# Patient Record
Sex: FEMALE | Race: BLACK OR AFRICAN AMERICAN | Employment: FULL TIME | URBAN - METROPOLITAN AREA
[De-identification: names, ages, dates, MRNs, and addresses within clinical notes are randomized per-mention and may not be internally consistent; named-entity substitution may affect disease eponyms.]

---

## 2021-12-15 ENCOUNTER — HOSPITAL ENCOUNTER (INPATIENT)
Age: 31
LOS: 6 days | Discharge: HOME | DRG: 885 | End: 2021-12-21
Attending: PSYCHIATRY & NEUROLOGY | Admitting: PSYCHIATRY & NEUROLOGY
Payer: OTHER GOVERNMENT

## 2021-12-15 PROBLEM — F32.A DEPRESSION: Status: ACTIVE | Noted: 2021-12-15

## 2021-12-15 PROBLEM — R45.851 SUICIDAL IDEATIONS: Status: ACTIVE | Noted: 2021-12-15

## 2021-12-15 PROBLEM — F10.10 ETOH ABUSE: Status: ACTIVE | Noted: 2021-12-15

## 2021-12-15 PROCEDURE — 65220000003 HC RM SEMIPRIVATE PSYCH

## 2021-12-15 RX ORDER — ACETAMINOPHEN 325 MG/1
650 TABLET ORAL
Status: DISCONTINUED | OUTPATIENT
Start: 2021-12-15 | End: 2021-12-21 | Stop reason: HOSPADM

## 2021-12-15 RX ORDER — TRAZODONE HYDROCHLORIDE 50 MG/1
50 TABLET ORAL
Status: DISCONTINUED | OUTPATIENT
Start: 2021-12-15 | End: 2021-12-21 | Stop reason: HOSPADM

## 2021-12-15 RX ORDER — ESCITALOPRAM OXALATE 20 MG/1
20 TABLET ORAL DAILY
Status: ON HOLD | COMMUNITY
End: 2021-12-21 | Stop reason: SDUPTHER

## 2021-12-15 RX ORDER — HYDROXYZINE 50 MG/1
50 TABLET, FILM COATED ORAL
Status: DISCONTINUED | OUTPATIENT
Start: 2021-12-15 | End: 2021-12-21 | Stop reason: HOSPADM

## 2021-12-15 RX ORDER — ESCITALOPRAM OXALATE 10 MG/1
20 TABLET ORAL DAILY
Status: DISCONTINUED | OUTPATIENT
Start: 2021-12-16 | End: 2021-12-21 | Stop reason: HOSPADM

## 2021-12-15 RX ORDER — IBUPROFEN 600 MG/1
600 TABLET ORAL
Status: DISCONTINUED | OUTPATIENT
Start: 2021-12-15 | End: 2021-12-21 | Stop reason: HOSPADM

## 2021-12-15 RX ORDER — TOPIRAMATE 100 MG/1
150 TABLET, FILM COATED ORAL 2 TIMES DAILY
COMMUNITY
End: 2021-12-21

## 2021-12-15 RX ORDER — LORAZEPAM 2 MG/ML
1 INJECTION INTRAMUSCULAR
Status: DISCONTINUED | OUTPATIENT
Start: 2021-12-15 | End: 2021-12-21 | Stop reason: HOSPADM

## 2021-12-15 RX ORDER — ADHESIVE BANDAGE
30 BANDAGE TOPICAL DAILY PRN
Status: DISCONTINUED | OUTPATIENT
Start: 2021-12-15 | End: 2021-12-21 | Stop reason: HOSPADM

## 2021-12-15 RX ORDER — ASPIRIN 325 MG/1
100 TABLET, FILM COATED ORAL DAILY
Status: DISCONTINUED | OUTPATIENT
Start: 2021-12-16 | End: 2021-12-21 | Stop reason: HOSPADM

## 2021-12-15 RX ORDER — IBUPROFEN 400 MG/1
400 TABLET ORAL
Status: DISCONTINUED | OUTPATIENT
Start: 2021-12-15 | End: 2021-12-15

## 2021-12-15 NOTE — BH NOTES
32 y.o  [place] female admitted voluntarily to [address] [place] [address] under the professional care of Dr. Magui Rodriguez with a diagnosis of Suicidal Ideations with plan to hang self and ETOH Abuse. Pt is a  and was admitted to [address] [place] [address] from [facility] ED. Pt was spent 24 + hours in the ED because they did not have any beds available. Pt boyfriend convinced her to go to ED for help as she relapsed on ETOH in Oct 2021 after being sober since March 2021. 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 Pt states she feels hopeless since she lost her job and is drinking again. Pt states she drinks daily and has been taking shots until she passes out. Pt has been to rehab x 2 via [facility] and inpatient at [facility]. Followed by psych at [facility]. Prescribed medications Lexapro 20 mg daily and Topamax 150 mg PO BID. Pt admits she has not been compliant since she relapsed. Denies drug use. Smokes daily but declined patch. Labs from [facility] were unremarkable, HCG neg,uds negative,BAL 53. Pt is alert, oriented x4. Affect is flat sad and depressed. Reports anxiety 7/10, no other s/s of ETOH withdrawal noted or reported. [facility] contacted, orders received. Vital signs stable, tour of unit provided.

## 2021-12-15 NOTE — PROGRESS NOTES
Problem: Pain Control  Goal: Maintain pain level at or below patient's acceptable level (or 5 if patient is unable to determine acceptable level)  Outcome: Ongoing  Patient's Stated Pain Goal: No pain Problem: Suicide  Goal: *STG: Seeks staff when feelings of self harm or harm towards others arise  Outcome: Progressing Towards Goal     Problem: Suicide  Goal: *STG: Attends activities and groups  Outcome: Progressing Towards Goal     Problem: Suicide  Goal: *STG:  Verbalizes alternative ways of dealing with maladaptive feelings/behaviors  Outcome: Progressing Towards Goal     Problem: Suicide  Goal: *STG/LTG: Complies with medication therapy  Outcome: Progressing Towards Goal

## 2021-12-16 PROCEDURE — 74011250637 HC RX REV CODE- 250/637: Performed by: PSYCHIATRY & NEUROLOGY

## 2021-12-16 PROCEDURE — 65220000003 HC RM SEMIPRIVATE PSYCH

## 2021-12-16 RX ORDER — OLANZAPINE 5 MG/1
5 TABLET ORAL
Status: CANCELLED | OUTPATIENT
Start: 2021-12-16

## 2021-12-16 RX ORDER — BENZTROPINE MESYLATE 1 MG/1
1 TABLET ORAL
Status: CANCELLED | OUTPATIENT
Start: 2021-12-16

## 2021-12-16 RX ORDER — MAG HYDROX/ALUMINUM HYD/SIMETH 200-200-20
30 SUSPENSION, ORAL (FINAL DOSE FORM) ORAL
Status: DISCONTINUED | OUTPATIENT
Start: 2021-12-16 | End: 2021-12-21 | Stop reason: HOSPADM

## 2021-12-16 RX ORDER — ACETAMINOPHEN 325 MG/1
650 TABLET ORAL
Status: CANCELLED | OUTPATIENT
Start: 2021-12-16

## 2021-12-16 RX ORDER — HALOPERIDOL 5 MG/ML
5 INJECTION INTRAMUSCULAR
Status: CANCELLED | OUTPATIENT
Start: 2021-12-16

## 2021-12-16 RX ORDER — LORAZEPAM 2 MG/ML
1 INJECTION INTRAMUSCULAR
Status: CANCELLED | OUTPATIENT
Start: 2021-12-16

## 2021-12-16 RX ORDER — LANOLIN ALCOHOL/MO/W.PET/CERES
6 CREAM (GRAM) TOPICAL
Status: CANCELLED | OUTPATIENT
Start: 2021-12-16

## 2021-12-16 RX ORDER — HYDROXYZINE 50 MG/1
50 TABLET, FILM COATED ORAL
Status: CANCELLED | OUTPATIENT
Start: 2021-12-16

## 2021-12-16 RX ORDER — DIPHENHYDRAMINE HYDROCHLORIDE 50 MG/ML
50 INJECTION, SOLUTION INTRAMUSCULAR; INTRAVENOUS
Status: CANCELLED | OUTPATIENT
Start: 2021-12-16

## 2021-12-16 RX ORDER — CHLORDIAZEPOXIDE HYDROCHLORIDE 25 MG/1
25 CAPSULE, GELATIN COATED ORAL
Status: CANCELLED | OUTPATIENT
Start: 2021-12-16

## 2021-12-16 RX ORDER — ADHESIVE BANDAGE
30 BANDAGE TOPICAL DAILY PRN
Status: CANCELLED | OUTPATIENT
Start: 2021-12-16

## 2021-12-16 RX ORDER — TRAZODONE HYDROCHLORIDE 50 MG/1
50 TABLET ORAL
Status: CANCELLED | OUTPATIENT
Start: 2021-12-16

## 2021-12-16 RX ADMIN — TRAZODONE HYDROCHLORIDE 50 MG: 50 TABLET ORAL at 21:57

## 2021-12-16 RX ADMIN — ALUMINUM HYDROXIDE, MAGNESIUM HYDROXIDE, AND SIMETHICONE 30 ML: 200; 200; 20 SUSPENSION ORAL at 21:30

## 2021-12-16 RX ADMIN — ESCITALOPRAM OXALATE 20 MG: 10 TABLET ORAL at 08:13

## 2021-12-16 RX ADMIN — HYDROXYZINE HYDROCHLORIDE 50 MG: 50 TABLET, FILM COATED ORAL at 08:13

## 2021-12-16 RX ADMIN — TOPIRAMATE 150 MG: 100 TABLET, FILM COATED ORAL at 21:57

## 2021-12-16 RX ADMIN — ACETAMINOPHEN 650 MG: 325 TABLET ORAL at 18:31

## 2021-12-16 RX ADMIN — THIAMINE HCL TAB 100 MG 100 MG: 100 TAB at 08:13

## 2021-12-16 RX ADMIN — TOPIRAMATE 150 MG: 100 TABLET, FILM COATED ORAL at 08:13

## 2021-12-16 NOTE — BH NOTES
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 The intake ID is [id number].

## 2021-12-16 NOTE — GROUP NOTE
IP  GROUP DOCUMENTATION INDIVIDUAL                                                                          Group Therapy Note    Date: 12/16/2021    Group Start Time: 0935  Group End Time: 9808  Group Topic: Education Group - Inpatient    SRM 2  NON ACUTE    Bethel GRIFFITHS  GROUP DOCUMENTATION GROUP    Group Therapy Note    Facilitated discussion focused on being aware of different feelings and their triggers and changes that need to be made to experience more comfortable feelings and less uncomfortable feelings    Attendees: 9/13         Attendance: Attended    Patient's Goal:  Attend group daily    Interventions/techniques: Informed and Supported    Follows Directions: Followed directions    Interactions: Interacted appropriately    Mental Status: Calm    Behavior/appearance: Cooperative    Goals Achieved: Able to engage in interactions, Able to listen to others, Able to self-disclose, Identified feelings and Identified triggers      Additional Notes:  Receptive to information and engaged.  Pt shared feeling \"depressed and anxious\" prior to admission due to \"not working, drinking, gain weight, have no money and have no purpose\" Pt shared in order to feel more  comfortable feelings she need to  \"find a way to stop drinking, find purpose, attend AA meetings, start a job, save money, diet, exercise, get back on medication and focus on positive relationships\"     Cristina Cooper

## 2021-12-16 NOTE — BH NOTES
Alert and oriented x4. Affect is flat and mood is depressed. Denies SI/HI at this time. No AVH noted or reported. CIWA 1 this am. No other s/s of detox noted. Pt isolative to her room, sleeping off and on. Medication and meal compliant. Remains on close observation for safety.

## 2021-12-16 NOTE — GROUP NOTE
IP  GROUP DOCUMENTATION INDIVIDUAL                                                                          Group Therapy Note    Date: 12/16/2021    Group Start Time: 3952  Group End Time: 1500  Group Topic: AA/NA    SRM 2 BEHA HLTH ACUTE    Kala Sandeep    IP  GROUP DOCUMENTATION GROUP    Group Therapy Note  The therapist facilitated a group on different relapse prevention strategies.      Attendees: 5         Attendance: {[facility] DOC ATTENDANCE:31770}    Patient's Goal:  ***    Interventions/techniques: {[facility] GROUP DOC INTERVENTIONS/TECHNIQUES:20844}    Follows Directions: {[facility] FOLLOWS DIRECTION:89422}    Interactions: {[facility] INTERACTIONS:78561}    Mental Status: {[facility] GROUP DOC MENTAL STATUS:62686}    Behavior/appearance: {[facility] GROUP DOC BEHAVIOR/APPEARANCE:63405}    Goals Achieved: {[facility] GROUP DOC GOALS ACHIEVED:42158}      Additional Notes:  ***    Rosalva Malave
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
## 2021-12-16 NOTE — PROGRESS NOTES
Problem: Suicide  Goal: *STG: Attends activities and groups  Outcome: Progressing Towards Goal     Problem: Suicide  Goal: *STG:  Verbalizes alternative ways of dealing with maladaptive feelings/behaviors  Outcome: Progressing Towards Goal     Problem: Suicide  Goal: *STG/LTG: Complies with medication therapy  Outcome: Progressing Towards Goal     Problem: Suicide  Goal: *STG/LTG: No longer expresses self destructive or suicidal thoughts  Outcome: Progressing Towards Goal

## 2021-12-16 NOTE — GROUP NOTE
[facility] GROUP DOCUMENTATION INDIVIDUAL                                                                          Group Therapy Note    Date: 12/16/2021    Group Start Time: 1000  Group End Time: 8725  Group Topic: [employer]    SRM 2 BEHA HLTH ACUTE    Desma Glass    IP [address] GROUP DOCUMENTATION GROUP    Group Therapy Note    Attendees: 6         Attendance: {[facility] GROUP DOC ATTENDANCE:11855}    Patient's Goal:  ***    Interventions/techniques: {[facility] GROUP DOC INTERVENTIONS/TECHNIQUES:09742}    Follows Directions: {[facility] GROUP DOC FOLLOWS DIRECTION:74127}    Interactions: {[facility] GROUP DOC INTERACTIONS:27559}    Mental Status: {[facility] GROUP DOC MENTAL STATUS:29257}    Behavior/appearance: {[facility] GROUP DOC BEHAVIOR/APPEARANCE:33379}    Goals Achieved: {[facility] GROUP DOC GOALS ACHIEVED:89726}      Additional Notes:  ***    Bridget Sneed

## 2021-12-16 NOTE — GROUP NOTE
[facility] GROUP DOCUMENTATION INDIVIDUAL                                                                          Group Therapy Note    Date: 12/16/2021    Group Start Time: 1239  Group End Time: 1500  Group Topic: AA/NA    SRM 2 [facility] ACUTE    Jane Loots    IP  GROUP DOCUMENTATION GROUP    Group Therapy Note  The therapist facilitated a group on different relapse prevention strategies. Attendees: 5         Attendance: Did not attend    Patient's Goal:      Interventions/techniques: Follows Directions:     Interactions:     Mental Status:     Behavior/appearance:     Goals Achieved: Additional Notes: The pt was invited to group but did not attend.      Misael Rios

## 2021-12-16 NOTE — BH NOTES
PSA PART II ADDITIONAL INFORMATION        Access To [employer]: No    Substance Use: YES    Last Use: 12/15/2021    Type of Substance: Alcohol use    Frequency of Use: Daily     Request to See : NO    If yes, notified : NO    Release of Information Signed: NO    Release of Information Signed For: The patient denied to sign NICOLE
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
## 2021-12-16 NOTE — GROUP NOTE
[facility] GROUP DOCUMENTATION INDIVIDUAL                                                                          Group Therapy Note    Date: 12/16/2021    Group Start Time: 1120  Group End Time: 3226  Group Topic: Process Group - Inpatient    [facility] CARE MANAGEMENT    Nicki Scott BSW    [facility] GROUP DOCUMENTATION GROUP    Group Therapy Note    The facilitator engaged the group in a worksheet and provided time for open discussion. And promoted feedback and support for one another. Attendees: 9/13          Attendance: Did not attend    Additional Notes: The patient was encouraged to attend group but did not attend.        SHAQUILLE Becker

## 2021-12-16 NOTE — PROGRESS NOTES
Shift Note:    Problem: Suicide  Goal: *STG: Remains safe in hospital  Outcome: Progressing Towards Goal  Goal: *STG/LTG: Complies with medication therapy  Outcome: Progressing Towards Goal  Goal: *STG/LTG: No longer expresses self destructive or suicidal thoughts  Outcome: Progressing Towards Goal     Ms. Moon was isolative and withdrawn to her room upon the onset of the shift. She denied thoughts to harm herself or others. She was able to contract for safety. She stated she did not need PRN medication for sleep or anxiety. She was alert and oriented times 4 and denied anxiety and did not rate depression. She presented labile when out of bed for water about 3:30 am.  She is currently in bed with eyes closed at even and unlabored breathing. Staff will continue to monitor on every 15 minute checks.

## 2021-12-17 PROCEDURE — 65220000003 HC RM SEMIPRIVATE PSYCH

## 2021-12-17 PROCEDURE — 74011250637 HC RX REV CODE- 250/637: Performed by: PSYCHIATRY & NEUROLOGY

## 2021-12-17 RX ADMIN — ESCITALOPRAM OXALATE 20 MG: 10 TABLET ORAL at 09:04

## 2021-12-17 RX ADMIN — TRAZODONE HYDROCHLORIDE 50 MG: 50 TABLET ORAL at 21:28

## 2021-12-17 RX ADMIN — TOPIRAMATE 150 MG: 100 TABLET, FILM COATED ORAL at 09:04

## 2021-12-17 RX ADMIN — THIAMINE HCL TAB 100 MG 100 MG: 100 TAB at 09:05

## 2021-12-17 RX ADMIN — HYDROXYZINE HYDROCHLORIDE 50 MG: 50 TABLET, FILM COATED ORAL at 09:08

## 2021-12-17 RX ADMIN — TOPIRAMATE 150 MG: 100 TABLET, FILM COATED ORAL at 21:24

## 2021-12-17 NOTE — GROUP NOTE
IP  GROUP DOCUMENTATION INDIVIDUAL                                                                          Group Therapy Note    Date: 12/17/2021    Group Start Time: 1320  Group End Time: 8805  Group Topic: Recreational/Music Therapy    SRM 2  NON ACUTE    Orlando Heaps    IP [facility] GROUP DOCUMENTATION GROUP    Group Therapy Note    Facilitated leisure skills group to reinforce positive coping through music, social interaction, group activities and arts/crafts    Attendees: 9/12         Attendance: Attended    Patient's Goal:  Attend group daily     Interventions/techniques: Art integration and Supported    Follows Directions:  Followed directions    Interactions: Interacted appropriately    Mental Status: Calm    Behavior/appearance: Cooperative    Goals Achieved: Able to engage in interactions and Able to listen to others      Additional Notes:  Receptive to listening to music and  a song she selected while writing in her journal. Interacted with peers and staff when prompted    Darcy Doe [address]

## 2021-12-17 NOTE — PROGRESS NOTES
Shift Note:    Problem: Suicide  Goal: *STG: Remains safe in hospital  Outcome: Progressing Towards Goal  Goal: *STG/LTG: Complies with medication therapy  Outcome: Progressing Towards Goal  Goal: *STG/LTG: No longer expresses self destructive or suicidal thoughts  Outcome: Progressing Towards Goal     Ms. Moon was alert and oriented times 4 at the onset of the shift she denied thoughts to harm herself or others. She denied hallucinations, anxiety and depression. She presented flat and able to express her needs appropriately. She requested night time medication for sleep. She received Trazodone along with her first dose of Topamax and Maalox PRN for gas. She had no further complaints of gas. She came to the nurses' station fully awake at [place] [address] requesting her door be shut completely with each safety check. She returned to bed with no signs of distress or anxiety. She will continued to be monitored on every 15 minute checks.

## 2021-12-17 NOTE — GROUP NOTE
[facility] GROUP DOCUMENTATION INDIVIDUAL                                                                          Group Therapy Note    Date: 12/17/2021    Group Start Time: 1115  Group End Time: 1200  Group Topic: Process Group - Inpatient    [facility] CARE MANAGEMENT    Nicki Scott BSW    [facility] GROUP DOCUMENTATION GROUP    Group Therapy Note    The facilitator engaged the group in a game and promoted feedback and support for one another. Attendees: 9/12         Attendance: Attended    Patient's Goal:  Attend Group     Interventions/techniques: Supported    Follows Directions: Followed directions    Interactions: Interacted appropriately    Mental Status: Anxious, Flat and Worried    Behavior/appearance: Agitated, Cooperative, Negative, Poor eye contact, Resistive/oppositional and Withdrawn/quiet    Goals Achieved: Able to listen to others      Additional Notes: The patient stated that she didn't want to come to group and that she was angry that she was there. But with time she was able to become less guarded and participated in the group game.      SHAQUILLE Soriano complains of pain/discomfort

## 2021-12-17 NOTE — BH NOTES
Behavioral Health Treatment Team Note     Patient goal(s) for today: 'go home'  Treatment team focus/goals: continue medication management, group therapy and discuss discharge planning     Progress note: Pt presents with a flat affect and congruent mood. Pt denied SI/HI/AVH and depression and anxieety. Pt is fixated on leaving, is still awaiting to speak with D19. Pt said she does not feel the need to be here because she just relapsed on alcohol and knows what she needs to do to return home. Pt still needs an inpatient level of care due to lack of insight into substance use and needing a safe discharge plan    LOS:  2  Expected LOS: 5-7 days     Insurance info/prescription coverage:  [employer]  Date of last family contact:  n/a  Family requesting physician contact today:  no  Discharge plan: Will return home with outpatient services, inpatient susbtance use treatment is recommended  Guns in the home:  no   Outpatient provider(s):   Will be coordinated prior to discharge     Participating treatment team members: Raul Melendez, * (assigned SW), NAM Mendez

## 2021-12-17 NOTE — BH NOTES
Pt reports wanting to leave AMA. Writer explained D19 and TDO prescreen process. Pt said she would like to speak with D19. Spoke with doctor who agreed.  Clinicals faxed to D19

## 2021-12-17 NOTE — BH NOTES
Patient isolative, affect flat, verbalized concerns about her dog, that come Monday she would not have anyone to take care of her dog. Patient referred to her ,  [place] notified. Patient rated anxiety at a 6 on a scale of 0-10. She said her depression was better, and denied SI, HI, AH, and VH. Patient remains on close observation, Q 15 minute checks.

## 2021-12-18 PROCEDURE — 74011250637 HC RX REV CODE- 250/637: Performed by: PSYCHIATRY & NEUROLOGY

## 2021-12-18 PROCEDURE — 65220000003 HC RM SEMIPRIVATE PSYCH

## 2021-12-18 RX ORDER — LOPERAMIDE HYDROCHLORIDE 2 MG/1
4 CAPSULE ORAL
Status: DISCONTINUED | OUTPATIENT
Start: 2021-12-18 | End: 2021-12-21 | Stop reason: HOSPADM

## 2021-12-18 RX ADMIN — ACETAMINOPHEN 650 MG: 325 TABLET ORAL at 19:54

## 2021-12-18 RX ADMIN — LOPERAMIDE HYDROCHLORIDE 4 MG: 2 CAPSULE ORAL at 14:08

## 2021-12-18 RX ADMIN — TOPIRAMATE 150 MG: 100 TABLET, FILM COATED ORAL at 21:04

## 2021-12-18 RX ADMIN — THIAMINE HCL TAB 100 MG 100 MG: 100 TAB at 09:00

## 2021-12-18 RX ADMIN — TRAZODONE HYDROCHLORIDE 50 MG: 50 TABLET ORAL at 21:04

## 2021-12-18 RX ADMIN — ESCITALOPRAM OXALATE 20 MG: 10 TABLET ORAL at 09:00

## 2021-12-18 RX ADMIN — HYDROXYZINE HYDROCHLORIDE 50 MG: 50 TABLET, FILM COATED ORAL at 17:37

## 2021-12-18 RX ADMIN — TOPIRAMATE 150 MG: 100 TABLET, FILM COATED ORAL at 08:59

## 2021-12-18 NOTE — BH NOTES
Behavioral Health Treatment Team Note     Patient goal(s) for today: \"I want to go home and see my dog\"   Treatment team focus/goals: Attend group and continue medication management     Progress note: The patient presented in bed and kept the covers over her mouth when speaking with the writer which muffled her answers. She denied SI/HI/AVH at this time and was oriented x4. She described her mood as \"okay\" and presented an agitated mood with a guarded affect. She expressed frustration with her TDO but wouldn't talk any further. She maintained some eye contact and communicated clearly. Patient still needs an inpatient level of care due to lack of insight into substance use and needing a safe discharge plan. LOS:  3  Expected LOS: 5-7 Days     Insurance info/prescription coverage:  [employer]  Date of last family contact:  n/a  Family requesting physician contact today:  no  Discharge plan: Will return home with outpatient services, inpatient susbtance use treatment is recommended  Guns in the home:  no   Outpatient provider(s):   Will be coordinated prior to discharge     Participating treatment team members: Abbie Abebe,   Assigned Therapist Bisi Dominguez, Bere Wells

## 2021-12-18 NOTE — BH NOTES
Leisure skills group        Pt came to group for less than 5 minutes. Pulled out of group to meet with nursing staff.

## 2021-12-18 NOTE — BH NOTES
Patient remains in room resting at this time. Quiet and isolative. Appears guarded and somewhat paranoid. Patient denies SI,HI and AVH's. Patient is not on CIWA Protocol and does not show any s/sx of ETOH withdrawal. States her appetite is good and that she tolerates fluids well. Med-diet compliant. Very little interaction with unit peers PSR attendance encouraged. Makes no special requests at this time.

## 2021-12-18 NOTE — BH NOTES
1737pm: Patient asked for a PRN Vistaril due to anxiety. Patient was talking on the phone at the time that the writer went to get the medication. When the patient came to the nurses station she was smiling and laughing due to another peer making her laugh. Patient rated her anxiety level at this time as a 3/10 and stated that her anxiety went down because of that other peer making her laugh. PRN Atarax was given at this time. 1837pm: Medication effective. No further complaints.

## 2021-12-18 NOTE — PROGRESS NOTES
Psychiatric Progress Note    Patient: Mitchell Garber MRN: [id number]  SSN: [id number]    YOB: 1990  Age: 32 y.o. Sex: female      Admit Date: 12/15/2021       Subjective:     Mitchell Garber  reports feeling anxious, depressed and *** . Denies SI/HI/AH/VH. No aggression or violence. Appropriately interactive and aware. Tolerating medications well. Eating well and sleeping well. Case reviewed with nursing staff and no significant issues or events reported in the last 24 hours. Staff has observed patient interacting on the unit and attending group.     Objective:     Vitals:    12/16/21 0825 12/16/21 2105 12/17/21 0836 12/17/21 0901   BP: 117/76 114/71 97/61 97/61   Pulse: 73 80 71 71   Resp: 18 18 17 17   Temp: 97.7 °F (36.5 °C) 97.8 °F (36.6 °C) 98.4 °F (36.9 °C) 98.4 °F (36.9 °C)   Weight:       Height:            Mental Status Exam:   Sensorium  Oriented to time, place, and situation   Relations Connects with interviewer and engages appropriately   Eye Contact Appropriate   Appearance:  Appropriate for venue and season   Speech:  Normal rate, tone, volume and pattern   Thought Process: Linear, logical and goal-directed   Thought Content Free of delusions, hallucinations and does not appear to be responding to internal stimuli   Suicidal ideations None active and contracts for safety   Mood:  Depressed and anxious   Affect:  Flat, constricted and mood congruent   Memory    Intact   Concentration:  Intact   Insight:   Fair/poor   Judgment:  Fair/poor     No signs of tardive dyskinesia or extrapyramidal symptoms    MEDICATIONS:  Current Facility-Administered Medications   Medication Dose Route Frequency    loperamide (IMODIUM) capsule 4 mg  4 mg Oral Q6H PRN    alum-mag hydroxide-simeth (MYLANTA) oral suspension 30 mL  30 mL Oral Q4H PRN    hydrOXYzine HCL (ATARAX) tablet 50 mg  50 mg Oral TID PRN    LORazepam (ATIVAN) injection 1 mg  1 mg IntraMUSCular Q4H PRN    traZODone (DESYREL) tablet 50 mg  50 mg Oral QHS PRN    acetaminophen (TYLENOL) tablet 650 mg  650 mg Oral Q4H PRN    magnesium hydroxide (MILK OF MAGNESIA) 400 mg/5 mL oral suspension 30 mL  30 mL Oral DAILY PRN    thiamine mononitrate (B-1) tablet 100 mg  100 mg Oral DAILY    escitalopram oxalate (LEXAPRO) tablet 20 mg  20 mg Oral DAILY    topiramate (TOPAMAX) tablet 150 mg  150 mg Oral BID    ibuprofen (MOTRIN) tablet 600 mg  600 mg Oral Q6H PRN        DISCUSSION:  Discussed risk and benefits of medication, provided an opportunity to answer any questions, reviewed discharge goals. Lab/Data Review:  No results found for this or any previous visit (from the past 24 hour(s)). Assessment:     Principal Problem:    Suicidal ideations (12/15/2021)    Active Problems:    Depression (12/15/2021)      ETOH abuse (12/15/2021)        Plan:     Continue current plan of care excepted as noted. Continue to participate in the milieu of activities and work towards discharge goals. Contracts for safety and has no immediate requests    Disposition planning with social work with the goal of a transition to an outpatient level of care. Patient would benefit from ongoing care as they have not yet reached their discharge goals are psychotropic medication optimization.     Tentative discharge TBD     Signed By: Martina Morrison, PhD, PA-C, PsyCAQ    December 18, 2021

## 2021-12-18 NOTE — BH NOTES
1408: Patient was given PRN loperamide for complaints of loose stool/diarrhea. 1510: No further complaints. Medication effective.

## 2021-12-18 NOTE — BH NOTES
's Dept to arrive with TDO paperwork for the patient. Paperwork served at the bedside. All TDO paperwork explained to the patient via the [facility]. TDO paperwork signed and then a copy placed on the front of the chart.

## 2021-12-18 NOTE — GROUP NOTE
[facility] GROUP DOCUMENTATION INDIVIDUAL                                                                          Group Therapy Note    Date: 12/18/2021    Group Start Time: 5796  Group End Time: 1100  Group Topic: Nursing    SRM 2  NON ACUTE    Tonna Siemens, Jenean Brisker, BRANDEN    [facility] GROUP DOCUMENTATION GROUP    Group Therapy Note : Importance of Medication Compliance     Attendees: 6/12         Attendance: Did not attend    Patient's Goal:      Interventions/techniques: Follows Directions:     Interactions:     Mental Status:     Behavior/appearance:     Goals Achieved: Additional Notes:  Patient encouraged to attend the group but patient declined and did not attend.      Mary Anne Hancock RN

## 2021-12-18 NOTE — BH NOTES
DAYSHIFT NOTE:     Patient started off this morning withdrawn and isolative. Patient refused to have her vitals checked. Patient did not get up for her breakfast. Staff held her breakfast for her and patient was reassured that her meal was saved for her, but patient did not eat her breakfast. When the writer approached the patient this morning patient was laying in her bed and mostly covered up with her blanket and short and abrupt with the writer. Patient asked the writer Farhad Rucker are you? \" Writer introduced self and title. Patient stated that she was angry and worried about her dog. When asked the patient if she had anyone that was taking care of her dog she was irritable and stated, \"I don't know. \" Patient denies having SI/HI. Denies AH/VH. Denies having any withdrawal symptoms this morning. Patient is medication compliant. Patient has a very flat affect and is short answered with the writer this morning. For lunch the [facility] had a long talk with the patient and the patient ended up getting up and eating her lunch. Patient has been out her room more since talking with the [facility] and went to the music group this afternoon for a short period. Patient did question the writer about how long she was going to be here and how she was already here under a TDO. The process of being here under a TDO was explained to her and how she would see a  on Monday and patient presented very surprised and asked if the days she has already spent here would be considered. Patient has asked to get her counselor's phone number out of her phone that is locked in the safe and that process was explained to her as well. Patient has been out more since talking with [facility] but continues to present with an overall flat affect and anxious. Close observations continued to ensure patient safety.

## 2021-12-18 NOTE — GROUP NOTE
[facility]  GROUP DOCUMENTATION INDIVIDUAL                                                                          Group Therapy Note    Date: 12/18/2021    Group Start Time: 2095  Group End Time: 0940  Group Topic: [employer] Meeting    SRM 2  NON ACUTE    Félix, Nany [facility] GROUP DOCUMENTATION GROUP    Group Therapy Note    Attendees         Attendance: Did not attend    Patient's Goal:      Interventions/techniques: Follows Directions:     Interactions:     Mental Status:     Behavior/appearance:     Goals Achieved: Additional Notes:  Pt was encouraged to attend group but chose not to attend.     Elsa Guerrero

## 2021-12-18 NOTE — PROGRESS NOTES
Progress Note  Date:2021       Room:[facility]  Patient Cynthia Gibson     YOB: 1990     Age:31 y.o. Subjective    Subjective , want to go home or after surgery basic there and then no one to take care of the dog boyfriend promised to take care of the dog but then he wants to leave early he want to have certain conversation if not to get upset try not to take care of the dog. Review of Systems  Objective         Vitals Last 24 Hours:  TEMPERATURE:  Temp  Av.4 °F (36.9 °C)  Min: 98.4 °F (36.9 °C)  Max: 98.4 °F (36.9 °C)  RESPIRATIONS RANGE: Resp  Av  Min: 17  Max: 17  PULSE OXIMETRY RANGE: No data recorded  PULSE RANGE: Pulse  Av  Min: 71  Max: 71  BLOOD PRESSURE RANGE: Systolic (98DET), UGE:71 , Min:97 , FTM:44   ; Diastolic (04DVR), JGQ:31, Min:61, Max:61    I/O (24Hr): No intake or output data in the 24 hours ending 21 3873  Objective  Labs/Imaging/Diagnostics    Labs:  CBC:No results for input(s): WBC, RBC, HGB, HCT, MCV, RDW, PLT, HGBEXT, HCTEXT, PLTEXT in the last 72 hours. CHEMISTRIES:No results for input(s): NA, K, CL, CO2, BUN, CA, PHOS, MG in the last 72 hours. No lab exists for component: CREATININE, GLUCOSEPT/INR:No results for input(s): INR, INREXT in the last 72 hours. No lab exists for component: PROTIME  APTT:No results for input(s): APTT in the last 72 hours. LIVER PROFILE:No results for input(s): AST, ALT in the last 72 hours. No lab exists for component: BILIDIR, BILITOT, ALKPHOS  No results found for: ALT, AST, GGT, GGTP, AP, APIT, APX, CBIL, TBIL, TBILI    Imaging Last 24 Hours:  No results found.   Assessment//Plan   Active Problems:    Depression (12/15/2021)      ETOH abuse (12/15/2021)      Suicidal ideations (12/15/2021)      Assessment & Plan patient case discussed in the treatment team in the morning psychosis situation her insight participation patient isolated herself in the room covered with the sheets hard to understand speech. Soft tone of voice some mumbling noted basically want to go home to take care of the got a upcoming job and job on 27th of 29th in [place]. Patient says boyfriend is some times already eccentric he promised to take care of the dog now she is only want to take care of the dog of the uncertain time she want to go home today staff notified about the D 19 and the options outcomes patient still want to CD19 I spoke with the D 19 at the intent to keep her to do if she wants to she tried to hang herself.   Encouraged to attend all the groups her affect is flat compliant with medications taking Escitalopram Topamax thiamine continued inpatient level of care indicated thank you D 19 going to encourage her to stay in the hospital if not able to do her    Electronically signed by Seymour Singer MD on 12/17/2021 at 10:58 PM

## 2021-12-19 PROCEDURE — 65220000003 HC RM SEMIPRIVATE PSYCH

## 2021-12-19 PROCEDURE — 74011250637 HC RX REV CODE- 250/637: Performed by: PSYCHIATRY & NEUROLOGY

## 2021-12-19 RX ADMIN — TOPIRAMATE 150 MG: 100 TABLET, FILM COATED ORAL at 21:31

## 2021-12-19 RX ADMIN — ESCITALOPRAM OXALATE 20 MG: 10 TABLET ORAL at 08:38

## 2021-12-19 RX ADMIN — TRAZODONE HYDROCHLORIDE 50 MG: 50 TABLET ORAL at 21:31

## 2021-12-19 RX ADMIN — THIAMINE HCL TAB 100 MG 100 MG: 100 TAB at 08:38

## 2021-12-19 RX ADMIN — TOPIRAMATE 150 MG: 100 TABLET, FILM COATED ORAL at 08:38

## 2021-12-19 NOTE — GROUP NOTE
[facility] GROUP DOCUMENTATION INDIVIDUAL                                                                          Group Therapy Note    Date: 12/19/2021    Group Start Time: 0933  Group End Time: 1892  Group Topic: Education Group - Inpatient    SRM 2  NON ACUTE    Maddy Galvan    IP [facility] GROUP DOCUMENTATION GROUP    Group Therapy Note    Healthy relationships/Facilitated discussion focused on breaking down our walls and  being able to recognize attitudes and behaviors that may get in the way of forming or maintaining  quality relationships    Attendees: 11/12         Attendance: Attended    Patient's Goal:  Attend group daily     Interventions/techniques: Informed and Supported    Follows Directions: Followed directions    Interactions: Interacted appropriately    Mental Status: Calm    Behavior/appearance: Cooperative    Goals Achieved: Able to engage in interactions, Able to listen to others and Able to self-disclose      Additional Notes:  Receptive to information discussed and engaged.  Pt shared she recognize \"isolating tendencies, defensiveness/oversensitivity and indecisiveness\" get in the way of forming or maintaining a quality relationship    Tomás Bustos , [address]

## 2021-12-19 NOTE — BH NOTES
DAYSHIFT NOTE:     Patient sitting in the dayroom this morning eating breakfast amongst her peers. Patient is medication compliant. Patient has an overall flat affect with the assessment but has opened up some throughout the day and will smile at times with conversations. Patient denies having any depression and or anxiety. Denies SI/HI. Attends groups. Patient asked if she could sit in the separate group room earlier this morning to read her book, but patient was educated that staff would have to be present in that room and she had a private room that she could use to read. Patient stated that she was cold in her room and patient was given an extra shirt from out the storage closet and patient was thankful. Patient asked to get her caseworkers number from out her phone in the safe and this writer allowed her to get that number with staff presence and safe bag was re-bagged and sealed and placed back into the safe. Patient will sit in the floor at her room door and read or write at times due to being re-directed from sitting in the floor in the hallway. Patient also needed redirection about not being able to use a blanket in the dayroom. Patient has been on the phone at times today. Patient has been awake and up more today in the milieu. Patient has been up for her meals and did accept having her vitals checked today. Close observations continued to ensure patient safety.

## 2021-12-19 NOTE — GROUP NOTE
IP  GROUP DOCUMENTATION INDIVIDUAL                                                                          Group Therapy Note    Date: 12/19/2021    Group Start Time: 1320  Group End Time: 2779  Group Topic: Recreational/Music Therapy    SRM 2 BH NON ACUTE    Georgina Beltrán    IP [facility] GROUP DOCUMENTATION GROUP    Group Therapy Note    Facilitated leisure skills group as a positive way to cope and manage mood through music and art task    Attendees: 10/12         Attendance: Attended    Patient's Goal:  Attend group daily    Interventions/techniques: Art integration and Supported    Follows Directions: Followed directions    Interactions: Interacted appropriately    Mental Status: Calm    Behavior/appearance: Cooperative    Goals Achieved: Able to engage in interactions and Able to listen to others      Additional Notes:  Receptive to listening to music and  a song she selected while working on leisure task.   Interacted when prompted    Jennifer Brenner, [address]

## 2021-12-20 PROCEDURE — 74011250637 HC RX REV CODE- 250/637: Performed by: PSYCHIATRY & NEUROLOGY

## 2021-12-20 PROCEDURE — 65220000003 HC RM SEMIPRIVATE PSYCH

## 2021-12-20 RX ADMIN — TOPIRAMATE 150 MG: 100 TABLET, FILM COATED ORAL at 21:37

## 2021-12-20 RX ADMIN — ALUMINUM HYDROXIDE, MAGNESIUM HYDROXIDE, AND SIMETHICONE 30 ML: 200; 200; 20 SUSPENSION ORAL at 16:10

## 2021-12-20 RX ADMIN — THIAMINE HCL TAB 100 MG 100 MG: 100 TAB at 08:23

## 2021-12-20 RX ADMIN — TRAZODONE HYDROCHLORIDE 50 MG: 50 TABLET ORAL at 21:38

## 2021-12-20 RX ADMIN — ESCITALOPRAM OXALATE 20 MG: 10 TABLET ORAL at 08:23

## 2021-12-20 RX ADMIN — TOPIRAMATE 150 MG: 100 TABLET, FILM COATED ORAL at 08:23

## 2021-12-20 NOTE — BH NOTES
TDO DISPOSTION    Pt TDO hearing on 12/20/21 committed up 4 days. Represented by Rosalina Cee. Juan Merrill.

## 2021-12-20 NOTE — GROUP NOTE
[facility]  GROUP DOCUMENTATION INDIVIDUAL                                                                          Group Therapy Note    Date: 12/20/2021    Group Start Time: 0900  Group End Time: 0930  Group Topic: [employer]    SRM 2  NON ACUTE    Kin BR[facility] [address] GROUP DOCUMENTATION GROUP    Group Therapy Note    Attendees:        Attendance: Attended    Patient's Goal:  To get discharged    Interventions/techniques: Provide feedback    Follows Directions:  Followed directions    Interactions: Interacted appropriately    Mental Status: Calm    Behavior/appearance: Attentive    Goals Achieved: Able to engage in interactions and Able to listen to others      Additional Notes:      Horacio Kulkarni

## 2021-12-20 NOTE — BH NOTES
Patient making and doing progress in her treatment and recovery. Less isolative but remains quiet in mixed company. Less in room and out more in the mileau. Denies SI,HI and AVH's. Med-diet compliant. Attends PSR. Mindset-determined that she will do well and meet criteria to go home soon. Still presents herself as angry or irritable. Gives answers that are short and abrupt. Remains cooperative and compliant with the mileau. Talks of no pain. Comfortable. Does make phone calls during the day. Does watch TV and does socialize, minimally.

## 2021-12-20 NOTE — GROUP NOTE
[facility] GROUP DOCUMENTATION INDIVIDUAL                                                                          Group Therapy Note    Date: 12/20/2021    Group Start Time: 1115  Group End Time: 1200  Group Topic: Process Group - Inpatient    SRM 2 BEHA HLTH ACUTE    Jane Loots    IP [address] [facility] GROUP DOCUMENTATION GROUP    Group Therapy Note  The therapist facilitated a group using the stoplight challenge exercise. In this exercise the pt's are asked to identify something that they acknowledge as an issue but haven't started working on yet, something that they are currently working on, and something that they have accomplished or are proud of. Attendees: 9         Attendance: Attended    Patient's Goal:  To attend groups and activities     Interventions/techniques: Supported    Follows Directions: Followed directions    Interactions: Interacted appropriately    Mental Status: Calm    Behavior/appearance: Attentive and Cooperative    Goals Achieved: Able to engage in interactions and Able to listen to others      Additional Notes: The pt seemed engaged in group, although she was pulled out of group for her hearing before she was able to share anything.      Misael Rios

## 2021-12-20 NOTE — PROGRESS NOTES
Patient participated in [address] [place] [address] on 12/20/2021. Rev.  Eric Blue MDiv, [facility], [facility] paging service: [phone])

## 2021-12-20 NOTE — BH NOTES
Patient cooperative upon approach, affect flat and irritable with poor eye contact. Patient was medication compliant. She denied SI, HI, AH, VH, depression and anxiety. Patient remains on close observation, Q 15 minute checks.

## 2021-12-20 NOTE — GROUP NOTE
[facility] GROUP DOCUMENTATION INDIVIDUAL                                                                          Group Therapy Note    Date: 12/20/2021    Group Start Time: 0930  Group End Time: 9012  Group Topic: Process Group - Inpatient    SRM CARE MANAGEMENT    Nicki Scott BSW    [facility] GROUP DOCUMENTATION GROUP    Group Therapy Note    The facilitator educated the group on fight or flight through psych education and promoted feedback and support for one another. Attendees: 11/13         Attendance: Attended    Patient's Goal:  Attend Group     Interventions/techniques: Promoted peer support and Provide feedback    Follows Directions: Followed directions    Interactions: Interacted appropriately    Mental Status: Elevated    Behavior/appearance: Attentive, Caretaking, Cooperative and Neatly groomed    Goals Achieved: Able to engage in interactions, Able to listen to others, Able to give feedback to another, Able to reflect/comment on own behavior, Able to receive feedback and Able to self-disclose      Additional Notes: The patient was in a good mood today and provided good feedback to her peers. She talked about panic attacks that she has had in the past and that mindfulness, puzzles, coloring, and her medication have all been successful for her in countering her panic attacks.      SHAQUILLE Obando

## 2021-12-20 NOTE — BH NOTES
Behavioral Health Treatment Team Note     Patient goal(s) for today: \"Go home to my dog who is by himself\"   Treatment team focus/goals:  continue medication management, group therapy and discuss discharge planning     Progress note: The patient denied SI/HI/AVH at this time and was oriented x4. She appeared neatly groomed and described her mood as \" started off really good and now I'm annoyed and numb\". Presenting an agitated mood and affect, She expressed frustration with being at the hospital and that the people that are supposed to be involved with her care here haven't been doing their job. And how they could make judgments of how she is doing when they don't check in with her that much. The writer validated her feelings and offered her the patient advocate number to use. The patient also said she can't afford to go to rehab right now because she needs to work. Patient still needs an inpatient level of care due to lack of insight into substance use and needing a safe discharge plan     LOS:  5  Expected LOS: 6-8 Days     Insurance info/prescription coverage:  [employer]  Date of last family contact:  n/a  Family requesting physician contact today:  no  Discharge plan: Will return home with outpatient services, inpatient susbtance use treatment is recommended  Guns in the home:  no   Outpatient provider(s):   Will be coordinated prior to discharge     Participating treatment team members: Josep Renteria,   Assigned Therapist Jetty Apgar, MSW Kieran Blunt, [place]

## 2021-12-21 VITALS
WEIGHT: 220 LBS | BODY MASS INDEX: 33.34 KG/M2 | RESPIRATION RATE: 18 BRPM | OXYGEN SATURATION: 100 % | TEMPERATURE: 98.6 F | HEIGHT: 68 IN | HEART RATE: 48 BPM | SYSTOLIC BLOOD PRESSURE: 92 MMHG | DIASTOLIC BLOOD PRESSURE: 60 MMHG

## 2021-12-21 PROCEDURE — 74011250637 HC RX REV CODE- 250/637: Performed by: PSYCHIATRY & NEUROLOGY

## 2021-12-21 RX ORDER — TOPIRAMATE 50 MG/1
100 TABLET, FILM COATED ORAL 3 TIMES DAILY
Qty: 30 TABLET | Refills: 2 | Status: SHIPPED | OUTPATIENT
Start: 2021-12-21

## 2021-12-21 RX ORDER — TRAZODONE HYDROCHLORIDE 50 MG/1
50 TABLET ORAL
Qty: 15 TABLET | Refills: 1 | Status: SHIPPED | OUTPATIENT
Start: 2021-12-21

## 2021-12-21 RX ORDER — ESCITALOPRAM OXALATE 20 MG/1
20 TABLET ORAL DAILY
Qty: 30 TABLET | Refills: 0 | Status: SHIPPED | OUTPATIENT
Start: 2021-12-21

## 2021-12-21 RX ADMIN — TOPIRAMATE 150 MG: 100 TABLET, FILM COATED ORAL at 09:12

## 2021-12-21 RX ADMIN — THIAMINE HCL TAB 100 MG 100 MG: 100 TAB at 09:12

## 2021-12-21 RX ADMIN — ESCITALOPRAM OXALATE 20 MG: 10 TABLET ORAL at 09:12

## 2021-12-21 NOTE — BH NOTES
DISCHARGE SUMMARY    Charles Sterling  : 1990  MRN: [id number]    The patient Mitchell Garber exhibits the ability to control behavior in a less restrictive environment. Patient's level of functioning is improving. No assaultive/destructive behavior has been observed for the past 24 hours. No suicidal/homicidal threat or behavior has been observed for the past 24 hours. There is no evidence of serious medication side effects. Patient has not been in physical or protective restraints for at least the past 24 hours. If weapons involved, how are they secured? n/a    Is patient aware of and in agreement with discharge plan? yes    Arrangements for medication:  Prescriptions given to patient, given a weeks supply or 30 day supply. Copy of discharge instructions to provider?:  yes    Arrangements for transportation home:  Pt will be taking uber home which she will schedule on her phone     Keep all follow up appointments as scheduled, continue to take prescribed medications per physician instructions.   Mental health crisis number:  455 or your local mental health crisis line number at [place] [address] Emergency WARM LINE      1-421-852-MHAV (8114)      M-F: 9am to 9pm      Sat & Sun: 5pm  9pm  National suicide prevention lines:                             8-274-RRGJNFH (0-283-285-784-067-5524)       5-094-529-TALK ([phone])    Crisis Text Line:  Text HOME to 100084

## 2021-12-21 NOTE — BH NOTES
Behavioral Health Transition Record to Provider    Patient Name: Triston Celaya  YOB: 1990  Medical Record Number: [id number]  Date of Admission: 12/15/2021  Date of Discharge: 12.21.21    Attending Provider: Miya Hernández MD  Discharging Provider: Dr Noris Ball  To contact this individual call [phone] and ask the  to page. If unavailable, ask to be transferred to [address] [place] [address] Provider on call. [facility] Provider will be available on call 24/7 and during holidays. Primary Care Provider: None    No Known Allergies    Reason for Admission: Pt was admitted for alcohol abuse and depression. Admission Diagnosis: Suicidal ideations [R45.851]  ETOH abuse [F10.10]  Depression [F32. A]    * No surgery found *    No results found for this or any previous visit. Immunizations administered during this encounter: There is no immunization history on file for this patient. Screening for Metabolic Disorders for Patients on Antipsychotic Medications  (Data obtained from the EMR)    Estimated Body Mass Index  Estimated body mass index is 33.45 kg/m² as calculated from the following:    Height as of this encounter: 5' 8\" (1.727 m). Weight as of this encounter: 99.8 kg (220 lb). Vital Signs/Blood Pressure  Visit Vitals  BP 92/60   Pulse (!) 48   Temp 98.6 °F (37 °C)   Resp 18   Ht 5' 8\" (1.727 m)   Wt 99.8 kg (220 lb)   SpO2 100%   BMI 33.45 kg/m²       Blood Glucose/Hemoglobin A1c  No results found for: GLU, GLUCPOC    No results found for: HBA1C, DAU5VJIJ     Lipid Panel  No results found for: CHOL, CHOLX, CHLST, CHOLV, 247051, HDL, HDLP, LDL, LDLC, DLDLP, TGLX, TRIGL, TRIGP, CHHD, CHHDX     Discharge Diagnosis: Suicidal ideations [R45.851]  ETOH abuse [F10.10]  Depression [F32. A]    Discharge Plan: Pt is returning home with outpatient services coordinated through the [place]. It is recommended that pt go to inpatient substance use tx but pt declined to do so.     Discharge Medication List and Instructions:   Current Discharge Medication List          Unresulted Labs (24h ago, onward)            None        To obtain results of studies pending at discharge, please contact [phone]    Follow-up Information     Follow up With Specialties Details Why [address] on 12/27/2021 for psychiatry appt [phone]  [address], 1M    [facility]  Call for trauma informed group therapy [address], [place], [address]          Advanced Directive:   Does the patient have an appointed surrogate decision maker? No  Does the patient have a Medical Advance Directive? No  Does the patient have a Psychiatric Advance Directive? No  If the patient does not have a surrogate or Medical Advance Directive AND Psychiatric Advance Directive, the patient was offered information on these advance directives Patient will complete at a later time    Patient Instructions: Please continue all medications until otherwise directed by physician. Tobacco Cessation Discharge Plan:   Is the patient a smoker and needs referral for smoking cessation? Not applicable  Patient referred to the following for smoking cessation with an appointment? Not applicable     Patient was offered medication to assist with smoking cessation at discharge? Not applicable  Was education for smoking cessation added to the discharge instructions? Not applicable    Alcohol/Substance Abuse Discharge Plan:   Does the patient have a history of substance/alcohol abuse and requires a referral for treatment? Yes  Patient referred to the following for substance/alcohol abuse treatment with an appointment? Refused  Patient was offered medication to assist with alcohol cessation at discharge? Not applicable  Was education for substance/alcohol abuse added to discharge instructions?  Not applicable    Patient discharged to Home; provided to the patient/caregiver either in hard copy or electronically.

## 2021-12-21 NOTE — BH NOTES
Patient pleasant upon approach, affect broad, attending groups, and is medication compliant. Patient denied SI, HI, AH, VH, depression and anxiety. Patient remains on close observation, Q 15 minute checks.
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
## 2021-12-21 NOTE — GROUP NOTE
33IP  GROUP DOCUMENTATION INDIVIDUAL                                                                          Group Therapy Note    Date: 12/21/2021    Group Start Time: 1619  Group End Time: 4470  Group Topic: Community Meeting    [facility] [address] [facility] GROUP DOCUMENTATION GROUP    Group Therapy Note    Attendees:          Attendance: Attended    Patient's Goal: Patient state mood is fantastic, goal is to go home and see her dog. Interventions/techniques: Supported    Follows Directions: Followed directions    Interactions: Interacted appropriately    Mental Status: Happy    Behavior/appearance: Attentive    Goals Achieved: Able to engage in interactions      Additional Notes:  Patient stated depression 0, anxiety 0,suicidal 0,homicidal 0.     [facility]

## 2021-12-21 NOTE — BH NOTES
Patient given her valuables, personal belongings, and discharge instructions. Patient verbalized understanding and left unit to a waiting uber.

## 2021-12-21 NOTE — GROUP NOTE
[facility] GROUP DOCUMENTATION INDIVIDUAL                                                                          Group Therapy Note    Date: 12/21/2021    Group Start Time: 1115  Group End Time: 1200  Group Topic: Process Group - Inpatient    SRM CARE MANAGEMENT    Nicki Scott BSW    [facility] GROUP DOCUMENTATION GROUP    Group Therapy Note    The facilitator encouraged the group to process through open discussion as well as promote feedback and support for one another. Attendees: 9/12         Attendance: Attended    Patient's Goal:  Attend Group     Interventions/techniques: Promoted peer support and Provide feedback    Follows Directions: Followed directions    Interactions: Interacted appropriately    Mental Status: Anxious    Behavior/appearance: Agitated, Attentive, Caretaking, Cooperative, Motivated and Negative    Goals Achieved: Able to engage in interactions, Able to listen to others, Able to give feedback to another, Able to reflect/comment on own behavior, Able to receive feedback, Able to self-disclose and Identified feelings      Additional Notes: The patient talked about how excited she is to go back home and see her dog. And that she has been upset about her time here and that she'll never come back. But encouraged her peers in group and helped them with things they needed that she could take care of.      SHAQUILLE Esposito

## 2021-12-21 NOTE — GROUP NOTE
IP  GROUP DOCUMENTATION INDIVIDUAL                                                                          Group Therapy Note    Date: 12/20/2021    Group Start Time: 4970  Group End Time: 1925  Group Topic: Reflection/Relaxation    SRM 2  NON ACUTE    Nasrin Montoya    [facility] GROUP DOCUMENTATION GROUP    Group Therapy Note    Attendees:9/10  Facilitated structured group to introduce relaxation technique using Mandalas and instrumentals to practice relaxation and mindfulness in group. Attendance: Attended    Patient's Goal:  STG: Attends activities and groups      Interventions/techniques: Art integration and Supported    Follows Directions: Followed directions    Interactions: Interacted appropriately    Mental Status: Calm    Behavior/appearance: Attentive    Goals Achieved: Able to engage in interactions and Able to listen to others      Additional Notes: Attended group and was receptive to intervention. Received material provided in group. Participated in relaxation technique of listening to instrumentals and coloring art task and puzzles. Verbalized enjoyment of group. Attended entire session.      Bard Esparza

## 2021-12-21 NOTE — GROUP NOTE
IP  GROUP DOCUMENTATION INDIVIDUAL                                                                          Group Therapy Note    Date: 12/21/2021    Group Start Time: 7939  Group End Time: 2544  Group Topic: Recreational/Music Therapy    SRM 2  NON ACUTE    Kunal Treviño    IP [address] [facility] GROUP DOCUMENTATION GROUP    Group Therapy Note    Facilitated leisure skills group to reinforce positive coping through music, social interaction, group activities and arts/crafts    Attendees: 8/13         Attendance: Attended    Patient's Goal:  Attend group daily     Interventions/techniques: Art integration and Supported    Follows Directions: Followed directions    Interactions: Interacted appropriately    Mental Status: Calm    Behavior/appearance: Cooperative    Goals Achieved: Able to engage in interactions and Able to listen to others      Additional Notes:  Receptive to listening to music and  a song she selected while working on leisure task.  Interacted with peers and staff    Tank Atkinson

## 2021-12-21 NOTE — BH NOTES
Patient more interactive and talkative with staff and unit peers. Frequents the dayroom. Watches TV, socializing and does some doodling. Anxious. Talks of her dog. \"That's my baby\". \"I can't have kids, so my dog is my kid\". States her BF travels out of town with his job and her dog is going to be left by himself. \"My BF left three bowls of food and three bowls of water out for the dog, but the dog can not be taken outside for bathroom use\". Worried about the welfare of her dog. Patient continues to find a reliable source to take care of her beloved pet. Encouraged the patient to share this information with her CM or SW to maybe get some #'s to help her set up somebody one to take care and look after her dog. Hopes to leave after her four days of commitment is completed. Denies SI,HI and AVH's.  VSS

## 2021-12-21 NOTE — GROUP NOTE
[facility] GROUP DOCUMENTATION INDIVIDUAL                                                                          Group Therapy Note    Date: 12/21/2021    Group Start Time: 0940  Group End Time: 1025  Group Topic: Education Group - Inpatient    SRM 2  NON ACUTE    Kamaljit Garibay    IP [address] [facility] GROUP DOCUMENTATION GROUP    Group Therapy Note    Facilitated  group to introduce the definition and  benefits of diaphragmatic breathing and demonstration of  relaxation technique    Attendees: 9/12         Attendance: Attended    Patient's Goal:  Attend group daily     Interventions/techniques: Informed and Supported    Follows Directions: Followed directions    Interactions: Interacted appropriately    Mental Status: Calm    Behavior/appearance: Cooperative    Goals Achieved: Able to engage in interactions and Able to listen to others      Additional Notes: Receptive to information discussed and was able to demonstrate ability to practice diaphragmatic breathing technique. Was able to sit quietly and focus on guided imagery. Verbalized feeling relaxed. Shared she uses a \"calm paul\" to help with anxiety    Shanti Wynne
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
## 2022-02-03 ENCOUNTER — NURSE TRIAGE (OUTPATIENT)
Dept: OTHER | Facility: CLINIC | Age: 32
End: 2022-02-03

## 2022-02-03 NOTE — TELEPHONE ENCOUNTER
Location of employment: [employer]     Location of injury (body part involved): right hand, dog bite    Time of injury: 11:00AM    Last 4 of SSN: [id number]    Subjective: Caller states \"Dog bite on right hand. \"     Current Symptoms: bite on right hand scabbed over, joint on first finger. Last tetanus booster sometime in last year, unsure of exact date. Denies redness/swelling. Pain Severity: Denies pain    Temperature: Denies      What has been tried: first aid, stopped bleeding. Recommended disposition: Home care. Care advice provided, patient verbalizes understanding; denies any other questions or concerns; instructed to call back for any new or worsening symptoms. Associate will follow up as needed, information packet emailed to associate.     Reason for Disposition   Minor hand or wrist injury    Protocols used: HAND AND WRIST INJURY-ADULT-[place]

## 2022-03-08 ENCOUNTER — OFFICE VISIT (OUTPATIENT)
Dept: URGENT CARE | Age: 32
End: 2022-03-08
Payer: COMMERCIAL

## 2022-03-08 VITALS
HEART RATE: 94 BPM | OXYGEN SATURATION: 99 % | DIASTOLIC BLOOD PRESSURE: 90 MMHG | TEMPERATURE: 100 F | SYSTOLIC BLOOD PRESSURE: 128 MMHG | WEIGHT: 194 LBS | RESPIRATION RATE: 18 BRPM | BODY MASS INDEX: 29.5 KG/M2

## 2022-03-08 DIAGNOSIS — R06.02 SOB (SHORTNESS OF BREATH): ICD-10-CM

## 2022-03-08 DIAGNOSIS — Z11.59 SCREENING FOR VIRAL DISEASE: ICD-10-CM

## 2022-03-08 DIAGNOSIS — J02.9 SORE THROAT: ICD-10-CM

## 2022-03-08 DIAGNOSIS — J40 BRONCHITIS: Primary | ICD-10-CM

## 2022-03-08 DIAGNOSIS — R11.2 NON-INTRACTABLE VOMITING WITH NAUSEA, UNSPECIFIED VOMITING TYPE: ICD-10-CM

## 2022-03-08 DIAGNOSIS — R05.9 COUGH: ICD-10-CM

## 2022-03-08 LAB
FLUAV+FLUBV AG NOSE QL IA.RAPID: NEGATIVE
FLUAV+FLUBV AG NOSE QL IA.RAPID: NEGATIVE
S PYO AG THROAT QL: NEGATIVE
SARS-COV-2 PCR, POC: NEGATIVE
VALID INTERNAL CONTROL?: YES
VALID INTERNAL CONTROL?: YES

## 2022-03-08 PROCEDURE — 87635 SARS-COV-2 COVID-19 AMP PRB: CPT | Performed by: FAMILY MEDICINE

## 2022-03-08 PROCEDURE — 87880 STREP A ASSAY W/OPTIC: CPT | Performed by: FAMILY MEDICINE

## 2022-03-08 PROCEDURE — 99204 OFFICE O/P NEW MOD 45 MIN: CPT | Performed by: FAMILY MEDICINE

## 2022-03-08 PROCEDURE — 87804 INFLUENZA ASSAY W/OPTIC: CPT | Performed by: FAMILY MEDICINE

## 2022-03-08 RX ORDER — ONDANSETRON 4 MG/1
4 TABLET, ORALLY DISINTEGRATING ORAL
Qty: 1 TABLET | Refills: 0 | Status: SHIPPED | COMMUNITY
Start: 2022-03-08 | End: 2022-03-08

## 2022-03-08 RX ORDER — UREA 10 %
LOTION (ML) TOPICAL
COMMUNITY

## 2022-03-08 RX ORDER — BENZONATATE 200 MG/1
200 CAPSULE ORAL
Qty: 30 CAPSULE | Refills: 0 | Status: SHIPPED | OUTPATIENT
Start: 2022-03-08

## 2022-03-08 RX ORDER — ONDANSETRON 4 MG/1
4 TABLET, ORALLY DISINTEGRATING ORAL
Qty: 10 TABLET | Refills: 0 | Status: SHIPPED | OUTPATIENT
Start: 2022-03-08

## 2022-03-08 RX ORDER — CEFDINIR 300 MG/1
300 CAPSULE ORAL 2 TIMES DAILY
Qty: 20 CAPSULE | Refills: 0 | Status: SHIPPED | OUTPATIENT
Start: 2022-03-08 | End: 2022-03-18
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
## 2022-03-08 NOTE — PATIENT INSTRUCTIONS
Nausea and Vomiting: Care Instructions  Your Care Instructions     When you are nauseated, you may feel weak and sweaty and notice a lot of saliva in your mouth. Nausea often leads to vomiting. Most of the time you do not need to worry about nausea and vomiting, but they can be signs of other illnesses. Two common causes of nausea and vomiting are stomach flu and food poisoning. Nausea and vomiting from viral stomach flu will usually start to improve within 24 hours. Nausea and vomiting from food poisoning may last from 12 to 48 hours. The doctor has checked you carefully, but problems can develop later. If you notice any problems or new symptoms, get medical treatment right away. Follow-up care is a key part of your treatment and safety. Be sure to make and go to all appointments, and call your doctor if you are having problems. It's also a good idea to know your test results and keep a list of the medicines you take. How can you care for yourself at home? · To prevent dehydration, drink plenty of fluids. Choose water and other clear liquids until you feel better. If you have kidney, heart, or liver disease and have to limit fluids, talk with your doctor before you increase the amount of fluids you drink. · Rest in bed until you feel better. · When you are able to eat, try clear soups, mild foods, and liquids until all symptoms are gone for 12 to 48 hours. Other good choices include dry toast, crackers, cooked cereal, and gelatin dessert, such as Jell-O. When should you call for help? Call 911 anytime you think you may need emergency care. For example, call if:    · You passed out (lost consciousness). Call your doctor now or seek immediate medical care if:    · You have symptoms of dehydration, such as:  ? Dry eyes and a dry mouth. ? Passing only a little urine. ?  Feeling thirstier than usual.     · You have new or worsening belly pain.     · You have a new or higher fever.     · You vomit blood or what looks like coffee grounds. Watch closely for changes in your health, and be sure to contact your doctor if:    · You have ongoing nausea and vomiting.     · Your vomiting is getting worse.     · Your vomiting lasts longer than 2 days.     · You are not getting better as expected. Where can you learn more? Go to http://www.mejia.com/  Enter H591 in the search box to learn more about \"Nausea and Vomiting: Care Instructions. \"  Current as of: July 1, 2021               Content Version: 13.0  © 2006-2021 [employer]. Care instructions adapted under license by [employer] (which disclaims liability or warranty for this information). If you have questions about a medical condition or this instruction, always ask your healthcare professional. [address] any warranty or liability for your use of this information. Bronchitis: Care Instructions  Your Care Instructions     Bronchitis is inflammation of the bronchial tubes, which carry air to the lungs. The tubes swell and produce mucus, or phlegm. The mucus and inflamed bronchial tubes make you cough. You may have trouble breathing. Most cases of bronchitis are caused by viruses like those that cause colds. Antibiotics usually do not help and they may be harmful. Bronchitis usually develops rapidly and lasts about 2 to 3 weeks in otherwise healthy people. Follow-up care is a key part of your treatment and safety. Be sure to make and go to all appointments, and call your doctor if you are having problems. It's also a good idea to know your test results and keep a list of the medicines you take. How can you care for yourself at home? · Take all medicines exactly as prescribed. Call your doctor if you think you are having a problem with your medicine.   · Get some extra rest.  · Take an over-the-counter pain medicine, such as acetaminophen (Tylenol), ibuprofen (Advil, Motrin), or naproxen (Aleve) to reduce fever and relieve body aches. Read and follow all instructions on the label. · Do not take two or more pain medicines at the same time unless the doctor told you to. Many pain medicines have acetaminophen, which is Tylenol. Too much acetaminophen (Tylenol) can be harmful. · Take an over-the-counter cough medicine to help quiet a dry, hacking cough so that you can sleep. Avoid cough medicines that have more than one active ingredient. Read and follow all instructions on the label. · Do not smoke. Smoking can make bronchitis worse. If you need help quitting, talk to your doctor about stop-smoking programs and medicines. These can increase your chances of quitting for good. When should you call for help? Call 911 anytime you think you may need emergency care. For example, call if:    · You have severe trouble breathing. Call your doctor now or seek immediate medical care if:    · You have new or worse trouble breathing.     · You cough up dark brown or bloody mucus (sputum).     · You have a new or higher fever.     · You have a new rash. Watch closely for changes in your health, and be sure to contact your doctor if:    · You cough more deeply or more often, especially if you notice more mucus or a change in the color of your mucus.     · You are not getting better as expected. Where can you learn more? Go to http://www.gray.com/  Enter H333 in the search box to learn more about \"Bronchitis: Care Instructions. \"  Current as of: July 6, 2021               Content Version: 13.0  © 2006-2021 [employer]. Care instructions adapted under license by [employer] (which disclaims liability or warranty for this information). If you have questions about a medical condition or this instruction, always ask your healthcare professional. [place] [address] any warranty or liability for your use of this information.

## 2022-03-18 PROBLEM — R45.851 SUICIDAL IDEATIONS: Status: ACTIVE | Noted: 2021-12-15

## 2022-03-18 PROBLEM — F10.10 ETOH ABUSE: Status: ACTIVE | Noted: 2021-12-15

## 2022-03-19 PROBLEM — F32.A DEPRESSION: Status: ACTIVE | Noted: 2021-12-15

## 2023-02-06 NOTE — BH NOTES
Patient has a medical condition that requires a higher level of medical care than can be provided at the [facility]. Patient is advised to go to the emergency department via EMS. Patient is refusing transport via emergency medical services. The patient desires to take a private auto to the emergency department. Patient aware of consequences of not going to the emergency department via EMS transport including a worsening of their medical conditions and possibly permanent disability/death. Patient possesses decisions making capacity.      Patient up and about in the [place]. Patient in the dayroom at time of the short question assessment. Watching TV, socializing, and even spoke on the phone. Still remains guarded and to the point even paranoid. Denies SI,HI and AVH's. Med-diet compliant. Participated in snack time this evening. Talks of wanting to be compliant so that when she goes for her hearing it will look like she is \"doing what is asked of her\". \"I want to go home soon\". Denies pain. Comfortable. VSS Attends some groups. Encouragement is emphasized. Patient attended to her hygiene and personal appearance.